# Patient Record
Sex: FEMALE | Employment: OTHER | URBAN - METROPOLITAN AREA
[De-identification: names, ages, dates, MRNs, and addresses within clinical notes are randomized per-mention and may not be internally consistent; named-entity substitution may affect disease eponyms.]

---

## 2017-10-23 ENCOUNTER — HOSPITAL ENCOUNTER (EMERGENCY)
Age: 52
Discharge: HOME | End: 2017-10-23
Attending: EMERGENCY MEDICINE
Payer: MEDICARE

## 2017-10-23 VITALS
DIASTOLIC BLOOD PRESSURE: 98 MMHG | RESPIRATION RATE: 18 BRPM | TEMPERATURE: 98.4 F | HEART RATE: 83 BPM | HEIGHT: 66 IN | SYSTOLIC BLOOD PRESSURE: 155 MMHG | OXYGEN SATURATION: 98 % | BODY MASS INDEX: 33.75 KG/M2 | WEIGHT: 210 LBS

## 2017-10-23 DIAGNOSIS — R03.0 ELEVATED BLOOD PRESSURE READING: ICD-10-CM

## 2017-10-23 DIAGNOSIS — S05.01XA ABRASION OF RIGHT CORNEA, INITIAL ENCOUNTER: Primary | ICD-10-CM

## 2017-10-23 PROCEDURE — 90715 TDAP VACCINE 7 YRS/> IM: CPT | Performed by: PHYSICIAN ASSISTANT

## 2017-10-23 PROCEDURE — 99283 EMERGENCY DEPT VISIT LOW MDM: CPT

## 2017-10-23 PROCEDURE — 90471 IMMUNIZATION ADMIN: CPT | Performed by: PHYSICIAN ASSISTANT

## 2017-10-23 PROCEDURE — 6360000002 HC RX W HCPCS: Performed by: PHYSICIAN ASSISTANT

## 2017-10-23 RX ORDER — ERYTHROMYCIN 5 MG/G
OINTMENT OPHTHALMIC
Qty: 1 TUBE | Refills: 0 | Status: SHIPPED | OUTPATIENT
Start: 2017-10-23 | End: 2017-11-02

## 2017-10-23 RX ADMIN — TETANUS TOXOID, REDUCED DIPHTHERIA TOXOID AND ACELLULAR PERTUSSIS VACCINE, ADSORBED 0.5 ML: 5; 2.5; 8; 8; 2.5 SUSPENSION INTRAMUSCULAR at 22:01

## 2017-10-23 ASSESSMENT — PAIN DESCRIPTION - ORIENTATION: ORIENTATION: RIGHT

## 2017-10-23 ASSESSMENT — PAIN DESCRIPTION - LOCATION: LOCATION: EYE

## 2017-10-23 ASSESSMENT — PAIN DESCRIPTION - PAIN TYPE: TYPE: ACUTE PAIN

## 2017-10-23 ASSESSMENT — PAIN SCALES - GENERAL: PAINLEVEL_OUTOF10: 10

## 2017-10-24 NOTE — ED PROVIDER NOTES
[facility] ED  eMERGENCY dEPARTMENT eNCOUnter      Pt Name: Иван Guerrero  MRN: [id number]  [place] 1965  Date of evaluation: 10/23/2017  Provider: Yanira Carreno Dr       Chief Complaint   Patient presents with    Eye Problem     rt eye irritation / pt thinks there is a FB in it past 3 hrs         HISTORY OF PRESENT ILLNESS  (Location/Symptom, Timing/Onset, Context/Setting, Quality, Duration, Modifying Factors, Severity.)   Иван Guerrero is a 47 y.o. female who presents to the emergency department with Right eye pain over last 3 hours. Patient thinks something got in her eye when she was in a monitored room. She does not know if it is from the left collecting she had a brief fainting respiratory doctor or her close. Pain described as mild, constant, itching and constant. She feels like she has something in her eye. She has flushed it repeatedly without any relief. Nursing Notes were reviewed.     ALLERGIES     Codeine    CURRENT MEDICATIONS       Discharge Medication List as of 10/23/2017 10:23 PM      CONTINUE these medications which have NOT CHANGED    Details   naproxen (NAPROSYN) 500 MG tablet Take 500 mg by mouth 2 times daily (with meals)      rOPINIRole (REQUIP) 1 MG tablet Take 1 mg by mouth 3 times daily      TraZODone & Diet Manage Prod (TRAZAMINE) 50 MG MISC Take by mouth      venlafaxine (EFFEXOR-XR) 75 MG XR capsule Take 75 mg by mouth daily             PAST MEDICAL HISTORY         Diagnosis Date    Arthritis     Cervical disc disease     Chronic headaches     Environmental allergies     Sinus congestion        SURGICAL HISTORY           Procedure Laterality Date    APPENDECTOMY      FOOT SURGERY Bilateral     NERVE BLOCK  09/02/2016    cervical #1 decadron 7mg    NERVE BLOCK  09/16/2016    cervical #2, celestone 9mg    TONSILLECTOMY           FAMILY HISTORY           Problem Relation Age of Onset    Hypertension Mother     Coronary Art Dis Father     Cancer Paternal Aunt      Family Status   Relation Status    Mother     Father     Paternal Aunt         SOCIAL HISTORY      reports that she has quit smoking. Her smoking use included Cigarettes. She has a 15.00 pack-year smoking history. She does not have any smokeless tobacco history on file. She reports that she drinks alcohol. She reports that she does not use drugs. REVIEW OF SYSTEMS    (2-9 systems for level 4, 10 or more for level 5)   Review of Systems    Except as noted above the remainder of the review of systems was reviewed and negative. PHYSICAL EXAM    (up to 7 for level 4, 8 or more for level 5)     ED Triage Vitals [10/23/17 2124]   BP Temp Temp Source Pulse Resp SpO2 Height Weight   (!) 155/98 98.4 °F (36.9 °C) Oral 83 18 98 % 5' 6\" (1.676 m) 210 lb (95.3 kg)     Physical Exam   Constitutional: She is oriented to person, place, and time. She appears well-developed. HENT:   Head: Normocephalic and atraumatic. Eyes: Right conjunctiva is injected. Slit lamp exam:       The right eye shows fluorescein uptake. Neck: Normal range of motion. Neck supple. Musculoskeletal: Normal range of motion. Neurological: She is alert and oriented to person, place, and time. Skin: Skin is warm. No rash noted. Psychiatric: She has a normal mood and affect.  Her behavior is normal.               DIAGNOSTIC RESULTS     EKG: All EKG's are interpreted by the Emergency Department Physician who either signs or Co-signs this chart in the absence of a cardiologist.        RADIOLOGY:   Non-plain film images such as CT, Ultrasound and MRI are read by the radiologist. Plain radiographic images are visualized and preliminarily interpreted by the emergency physician with the below findings:        Interpretation per the Radiologist below, if available at the time of this note:          ED BEDSIDE ULTRASOUND:   Performed by ED Physician - none    LABS:  Labs Reviewed - No data to display    All other labs were within normal range or not returned as of this dictation. EMERGENCY DEPARTMENT COURSE and DIFFERENTIAL DIAGNOSIS/MDM:   Vitals:    Vitals:    10/23/17 2124   BP: (!) 155/98   Pulse: 83   Resp: 18   Temp: 98.4 °F (36.9 °C)   TempSrc: Oral   SpO2: 98%   Weight: 210 lb (95.3 kg)   Height: 5' 6\" (1.676 m)     Tetanus updated. Referred to ophthalmology. Given erythromycin ointment and discharged home. Patient has a corneal abrasion. No obvious foreign body seen. Pre-hypertension/Hypertension: The patient has been informed that they may have pre-hypertension or Hypertension based on a blood pressure reading in the emergency department. I recommend that the patient call the primary care provider listed on their discharge instructions or a physician of their choice this week to arrange follow up for further evaluation of possible pre-hypertension or Hypertension. CONSULTS:  None    PROCEDURES:  Procedures        FINAL IMPRESSION      1. Abrasion of right cornea, initial encounter    2.  Elevated blood pressure reading          DISPOSITION/PLAN   DISPOSITION Decision to Discharge    PATIENT REFERRED TO:   Ann Marie Carl MD  [address] [place] [address]  [phone]    In 1 day        DISCHARGE MEDICATIONS:     Discharge Medication List as of 10/23/2017 10:23 PM      START taking these medications    Details   erythromycin (ROMYCIN) 5 MG/GM ophthalmic ointment Apply to affected eye 3 times a day for 5-7 days until symptoms clear., Disp-1 Tube, R-0, Print                 (Please note that portions of this note were completed with a voice recognition program.  Efforts were made to edit the dictations but occasionally words are mis-transcribed.)    GUSTAVO Smiley PA-C  10/23/17 [id number]